# Patient Record
Sex: MALE | Race: ASIAN | NOT HISPANIC OR LATINO | URBAN - METROPOLITAN AREA
[De-identification: names, ages, dates, MRNs, and addresses within clinical notes are randomized per-mention and may not be internally consistent; named-entity substitution may affect disease eponyms.]

---

## 2018-01-23 ENCOUNTER — EMERGENCY (EMERGENCY)
Facility: HOSPITAL | Age: 23
LOS: 1 days | Discharge: HOME | End: 2018-01-23
Admitting: EMERGENCY MEDICINE
Payer: COMMERCIAL

## 2018-01-23 VITALS
TEMPERATURE: 100 F | OXYGEN SATURATION: 95 % | DIASTOLIC BLOOD PRESSURE: 86 MMHG | SYSTOLIC BLOOD PRESSURE: 130 MMHG | RESPIRATION RATE: 18 BRPM | HEART RATE: 124 BPM

## 2018-01-23 DIAGNOSIS — J06.9 ACUTE UPPER RESPIRATORY INFECTION, UNSPECIFIED: ICD-10-CM

## 2018-01-23 DIAGNOSIS — Z87.891 PERSONAL HISTORY OF NICOTINE DEPENDENCE: ICD-10-CM

## 2018-01-23 DIAGNOSIS — J45.909 UNSPECIFIED ASTHMA, UNCOMPLICATED: ICD-10-CM

## 2018-01-23 PROCEDURE — 99284 EMERGENCY DEPT VISIT MOD MDM: CPT

## 2018-01-23 PROCEDURE — 71046 X-RAY EXAM CHEST 2 VIEWS: CPT | Mod: 26

## 2018-01-23 RX ORDER — IBUPROFEN 200 MG
600 TABLET ORAL ONCE
Qty: 0 | Refills: 0 | Status: COMPLETED | OUTPATIENT
Start: 2018-01-23 | End: 2018-01-23

## 2018-01-23 RX ORDER — IPRATROPIUM/ALBUTEROL SULFATE 18-103MCG
3 AEROSOL WITH ADAPTER (GRAM) INHALATION EVERY 6 HOURS
Qty: 0 | Refills: 0 | Status: DISCONTINUED | OUTPATIENT
Start: 2018-01-23 | End: 2018-01-27

## 2018-01-23 RX ADMIN — Medication 600 MILLIGRAM(S): at 16:04

## 2018-01-23 RX ADMIN — Medication 3 MILLILITER(S): at 15:45

## 2018-01-23 RX ADMIN — Medication 75 MILLIGRAM(S): at 16:04

## 2018-01-23 RX ADMIN — Medication 3 MILLILITER(S): at 16:05

## 2018-01-23 NOTE — ED PROVIDER NOTE - MEDICAL DECISION MAKING DETAILS
pt. with h/o asthma, uri sx X 48 hrs, hrt  124 likely 2/2 albuterol, well appearing, started on prednisone/z-pack yesterday, will do chest x- ray, tamiflu, re-asses.

## 2018-01-23 NOTE — ED PROVIDER NOTE - OBJECTIVE STATEMENT
Pt. with  PMH of asthma, well controlled, no H/o intubation, c/o cough, bodyaches, subjected fever, runny nose, increase wheezing, Saw PMd yesterday , started on zpack, prednisone. hasn't had a flu shot this yr, no chest x- ray. no recent hospitalizations, no sick contacts, no recent travel.

## 2018-11-03 ENCOUNTER — EMERGENCY (EMERGENCY)
Facility: HOSPITAL | Age: 23
LOS: 1 days | Discharge: HOME | End: 2018-11-03
Admitting: EMERGENCY MEDICINE
Payer: COMMERCIAL

## 2018-11-03 VITALS
HEART RATE: 109 BPM | DIASTOLIC BLOOD PRESSURE: 83 MMHG | OXYGEN SATURATION: 97 % | TEMPERATURE: 99 F | RESPIRATION RATE: 18 BRPM | SYSTOLIC BLOOD PRESSURE: 128 MMHG

## 2018-11-03 VITALS
OXYGEN SATURATION: 99 % | RESPIRATION RATE: 18 BRPM | DIASTOLIC BLOOD PRESSURE: 88 MMHG | HEART RATE: 99 BPM | SYSTOLIC BLOOD PRESSURE: 121 MMHG

## 2018-11-03 DIAGNOSIS — M25.561 PAIN IN RIGHT KNEE: ICD-10-CM

## 2018-11-03 DIAGNOSIS — Z79.899 OTHER LONG TERM (CURRENT) DRUG THERAPY: ICD-10-CM

## 2018-11-03 PROCEDURE — 73564 X-RAY EXAM KNEE 4 OR MORE: CPT | Mod: 26,RT

## 2018-11-03 PROCEDURE — 99283 EMERGENCY DEPT VISIT LOW MDM: CPT
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
## 2018-11-03 NOTE — ED PROVIDER NOTE - NSFOLLOWUPINSTRUCTIONS_ED_ALL_ED_FT
KEEP KNEE IMMOBILIZER IN PLACE FOR COMFORT AND STABILITY.   AVOID PUTTING WEIGHT ON THAT KNEE - USE CRUTCHES.   TAKE IBUPROFEN 600MG EVERY 6 HOURS WITH FOOD FOR PAIN.   TAKE TYLENOL 650MG EVERY 6 HOURS AS NEEDED FOR PAIN.   CALL THE ORTHOPEDIST TOMORROW FOR AN APPOINTMENT THIS WEEK.   RETURN TO ER FOR INCREASED PAIN, NUMBNESS, TINGLING, WEAKNESS, ANY OTHER NEW OR CONCERNING SYMPTOMS.
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
## 2018-11-03 NOTE — ED PROVIDER NOTE - MUSCULOSKELETAL, MLM
Spine appears normal, range of motion is not limited, no midline knee tenderness, no palpable effusion, no ligamentous laxity.

## 2018-11-03 NOTE — ED ADULT NURSE NOTE - CAS DISCH CONDITION
*CHEST PAIN, UNCERTAIN CAUSE    Based on your exam today, the exact cause of your chest pain is not certain. Your condition does not seem serious at this time, and your pain does not appear to be coming from your heart. However, sometimes the signs of a serious problem take more time to appear. Therefore, watch for the warning signs listed below.  HOME CARE:  1. Rest today and avoid strenuous activity.  2. Take any prescribed medicine as directed.  FOLLOW UP with your doctor in 1-3 days.   GET PROMPT MEDICAL ATTENTION if any of the following occur:    A change in the type of pain: if it feels different, becomes more severe, lasts longer, or begins to spread into your shoulder, arm, neck, jaw or back    Shortness of breath or increased pain with breathing    Weakness, dizziness, or fainting    Cough with blood or dark colored sputum (phlegm)    Fever over 101  F (38.3  C)    Swelling, pain or redness in one leg    [id number] [address], [place] [address]. All rights reserved. This information is not intended as a substitute for professional medical care. Always follow your healthcare professional's instructions.    
Stable

## 2018-11-27 PROBLEM — Z00.00 ENCOUNTER FOR PREVENTIVE HEALTH EXAMINATION: Status: ACTIVE | Noted: 2018-11-27

## 2018-12-04 ENCOUNTER — APPOINTMENT (OUTPATIENT)
Dept: MRI IMAGING | Facility: CLINIC | Age: 23
End: 2018-12-04

## 2021-04-25 ENCOUNTER — APPOINTMENT (OUTPATIENT)
Dept: DISASTER EMERGENCY | Facility: OTHER | Age: 26
End: 2021-04-25
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
## 2023-08-21 ENCOUNTER — EMERGENCY (EMERGENCY)
Facility: HOSPITAL | Age: 28
LOS: 1 days | Discharge: HOME | End: 2023-08-21
Attending: EMERGENCY MEDICINE | Admitting: EMERGENCY MEDICINE
Payer: MEDICAID

## 2023-08-21 VITALS
DIASTOLIC BLOOD PRESSURE: 105 MMHG | SYSTOLIC BLOOD PRESSURE: 147 MMHG | TEMPERATURE: 98 F | OXYGEN SATURATION: 99 % | HEART RATE: 101 BPM | RESPIRATION RATE: 16 BRPM

## 2023-08-21 PROCEDURE — 99284 EMERGENCY DEPT VISIT MOD MDM: CPT

## 2023-08-21 RX ORDER — LIDOCAINE 4 G/100G
1 CREAM TOPICAL
Qty: 7 | Refills: 1
Start: 2023-08-21 | End: 2023-09-03

## 2023-08-21 NOTE — ED PROVIDER NOTE - NSFOLLOWUPINSTRUCTIONS_ED_ALL_ED_FT
We've got you covered from head to toe    Our specialty programs    Concussion Program  [phone]  [email or web address]    Benita (Scheduling) team hours of operation:  Monday through Thursday, 7am to 8:30pm  Friday, 7am to 7pm  Saturday, 8am to 4pm      What is a concussion?  A concussion is a brain injury that affects how your brain works. It can happen when your brain gets bounced around in your skull after a fall or hit to the head.    What should I do if I think I have a concussion?  Report it    If you play a sport tell your  and parent if you think you or one of your teammates may have a concussion. You won’t play your best if you are not feeling well, and playing with a concussion is dangerous. Encourage your teammates to also report their symptoms.    Get checked out by a doctor    If you think you have a concussion, do not return to play any sports on the day of the injury. Only a doctor or other health care provider can tell if you have a concussion and when it’s OK to return to school and play    Give your brain time to heal    Most concussions get better within a couple of weeks. For some, a concussion can make everyday activities, such as going to school, harder. You may need extra help getting back to your normal activities. Be sure to update your parents and doctor about how you are feeling.    Regarding sports good teammates know: It's better to miss one game than the whole season.    How can I tell if I have a concussion?  You may have a concussion if you have any of these symptoms after a bump, blow, or jolt to the head or body:    * Get a headache.  * Feel dizzy, sluggish, or foggy.  * Be bothered by light or noise.  * Have double or blurry vision.  * Vomit or feel sick to your stomach.  * Have trouble focusing or problems remembering.  * Feel more emotional or “down.”  * Feel confused.  * Have problems with sleep.    A concussion feels different to each person, so it’s important to tell your parents or your doctor how you feel. You might notice concussion symptoms right away, but sometimes it takes hours or days until you notice that something isn’t right.    How can I help my team (For athletes) ?  Protect your brain    All your teammates should avoid hits to the head and follow the rules for safe play to lower chances of getting a concussion.    Be a team player    If one of your teammates has a concussion, tell them that they’re an important part of the team, and they should take the time they need to get better.    The information provided in this document or through linkages to other sites is not a substitute for medical or professional care. Questions about diagnosis and treatment for concussion should be directed to a physician or other health care provider.    To learn more, go towww.cdc.gov/HEADSUP    Centers for Disease Control and Prevention    National Center for Injury Prevention and Control    ADDITIONAL NOTES AND INSTRUCTIONS    Please follow up with your Primary MD in 24-48 hr.  Seek immediate medical care for any new/worsening signs or symptoms.
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
## 2023-08-21 NOTE — ED PROVIDER NOTE - ENMT, MLM
Airway patent, Nasal mucosa clear. Mouth with normal mucosa. Throat has no vesicles, no oropharyngeal exudates and uvula is midline. non suturable lac to left parietal area not actively bleeding

## 2023-08-21 NOTE — ED PROVIDER NOTE - PATIENT PORTAL LINK FT
You can access the FollowMyHealth Patient Portal offered by [facility] by registering at the following website: [email or web address]. By joining [facility]’s FollowMyHealth portal, you will also be able to view your health information using other applications (apps) compatible with our system.
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
